# Patient Record
(demographics unavailable — no encounter records)

---

## 2025-01-28 NOTE — ASSESSMENT
[FreeTextEntry1] : 65F w/hypothyroidism, seasonal allergies, external hemorrhoids here to establish care and cold.  HCM - labs today, needs mammo, utd on dexa, pap, cscope. needs flu, will get after viral illness resolves Viral URI - check RVP, supportive care Hypothyroidism - c/w levothyroxine, check TSH Eczema - refer to derm

## 2025-01-28 NOTE — REVIEW OF SYSTEMS
[Chills] : chills [Nasal Discharge] : nasal discharge [Postnasal Drip] : postnasal drip [Negative] : Heme/Lymph [Fever] : no fever

## 2025-01-28 NOTE — HISTORY OF PRESENT ILLNESS
[FreeTextEntry1] : CPE & eval  [de-identified] : 65F w/hypothyroidism, seasonal allergies, external hemorrhoids here to establish care and cold Viral uri symptoms - started over a week ago, started with chills at the beginning, body aches, now with congestion and rhinorrhea. Using nasal spray which helps.  Hypothyroid - stable, levothyroxine 112mcg Eczema - using ointments per derm, hasn't seen while

## 2025-01-28 NOTE — HEALTH RISK ASSESSMENT
[0] : 2) Feeling down, depressed, or hopeless: Not at all (0) [PHQ-2 Negative - No further assessment needed] : PHQ-2 Negative - No further assessment needed [Time Spent: ___ Minutes] : I spent [unfilled] minutes performing a depression screening for this patient. [Yes] : Yes [Monthly or less (1 pt)] : Monthly or less (1 point) [1 or 2 (0 pts)] : 1 or 2 (0 points) [Never (0 pts)] : Never (0 points) [No] : In the past 12 months have you used drugs other than those required for medical reasons? No [Patient reported mammogram was normal] : Patient reported mammogram was normal [Patient reported PAP Smear was normal] : Patient reported PAP Smear was normal [Never] : Never [Patient reported bone density results were normal] : Patient reported bone density results were normal [HIV Test offered] : HIV Test offered [Hepatitis C test offered] : Hepatitis C test offered [With Significant Other] : lives with significant other [Retired] : retired [] :  [Smoke Detector] : smoke detector [Seat Belt] :  uses seat belt [Sunscreen] : uses sunscreen [Patient reported colonoscopy was normal] : Patient reported colonoscopy was normal [de-identified] : GYN [de-identified] : inactive, walks limited to apt  [de-identified] : yogurt, fruits, olive oil  [[id number]] : 0 [Sexually Active] : not sexually active [Reports changes in hearing] : Reports no changes in hearing [Reports changes in vision] : Reports no changes in vision [Reports changes in dental health] : Reports no changes in dental health [MammogramDate] : 2023 [PapSmearDate] : 2024 [BoneDensityDate] : 2023 [ColonoscopyDate] : 2021 [BoneDensityComments] : osteopenia  [ColonoscopyComments] : repeat due  [de-identified] : retired 2021  [de-identified] : needs dentist

## 2025-01-28 NOTE — PHYSICAL EXAM
[No Acute Distress] : no acute distress [Well-Appearing] : well-appearing [EOMI] : extraocular movements intact [Normal TMs] : both tympanic membranes were normal [Clear to Auscultation] : lungs were clear to auscultation bilaterally [Normal Rate] : normal rate  [Regular Rhythm] : with a regular rhythm [Soft] : abdomen soft [Non Tender] : non-tender [Normal Bowel Sounds] : normal bowel sounds [No Focal Deficits] : no focal deficits [Normal Affect] : the affect was normal [Normal Insight/Judgement] : insight and judgment were intact [de-identified] : pharyngeal cobblestone

## 2025-03-11 NOTE — HISTORY OF PRESENT ILLNESS
[FreeTextEntry1] : 66 yo F, hypothyroidism partial thyroidectomy, FHx of CRC, for initial consult for colonoscopy  reports had two prior colons, first she was unsedated and was painful, second was Dr. De Dios either 2 or 3 years ago, Recalls 7 polyps. Not sure where the record is  Feels well just had PCP visit and had blood work performed  MedHx: as above SurgHx: Had pancreas tissue in intestine that was bleeding??? , and Uterine Art embo on file,  Medications: levothyroxine All: nkda SocHx: denies tobacco , lives in [place] retired  FHx: mother with CRC at 77 EGD: one prior, doesn't recall results Colon: 2023 - 7 polyps Dr. De Dios (no report)  No blood in stool No dysphagia no gerd

## 2025-03-11 NOTE — END OF VISIT
[] : Fellow [Time Spent: ___ minutes] : I have spent [unfilled] minutes of time on the encounter which excludes teaching and separately reported services. [FreeTextEntry3] : 65F with a h/o hypothyroidism s/p partial thyroidectomy, FHx of CRC, for initial consult for colonoscopy. Schedule surveillance colonoscopy with golytely at [facility] vs [facility] per patient preference.   Everett Wilkerson MD Gastroenterology

## 2025-03-11 NOTE — ASSESSMENT
[FreeTextEntry1] : 66 yo F, hypothyroidism partial thyroidectomy, FHx of CRC, for initial consult for colonoscop  High risk due to personal history of polyps and Fhx in mother in late 70's  recent blood work reviewed without anemia  Plan Golytely prep (considers herself constipated) at either [facility] or [facility] Discussed Risks / Benefits / Alternatives to Colon Cancer Screening, including endoscopic risks of bleeding, infection, perforation, or missed lesion Discussed Diet and Bowel Prep leading up to procedure Discussed need for chaperone post procedurally  rtc post proc  seen and dw Dr. Rock Dennison MD GI Fellow [facility] Gastroenterology

## 2025-03-11 NOTE — PHYSICAL EXAM
[Normal] : the appearance was normal, no neck mass [No Respiratory Distress] : no respiratory distress [No Acc Muscle Use] : no accessory muscle use [Respiration, Rhythm And Depth] : normal respiratory rhythm and effort [None] : no edema [Normal Color / Pigmentation] : normal skin color and pigmentation [] : no rash [Normal Affect] : the affect was normal [Recent Memory Normal] : recent memory was not impaired [Normal Mood] : the mood was normal [Abdomen Tenderness] : non-tender [Abdomen Soft] : soft

## 2025-03-17 NOTE — PHYSICAL EXAM
[No Acute Distress] : no acute distress [Well-Appearing] : well-appearing [Normal Sclera/Conjunctiva] : normal sclera/conjunctiva [EOMI] : extraocular movements intact [Normal Outer Ear/Nose] : the outer ears and nose were normal in appearance [No Lymphadenopathy] : no lymphadenopathy [Thyroid Normal, No Nodules] : the thyroid was normal and there were no nodules present [No Respiratory Distress] : no respiratory distress  [Clear to Auscultation] : lungs were clear to auscultation bilaterally [Normal Rate] : normal rate  [Regular Rhythm] : with a regular rhythm [No Focal Deficits] : no focal deficits [Normal Affect] : the affect was normal [Normal Insight/Judgement] : insight and judgment were intact

## 2025-03-17 NOTE — HISTORY OF PRESENT ILLNESS
[FreeTextEntry8] : 65F w/hypothyroidism, seasonal allergies, external hemorrhoids, chronic hematuria here for acute visit.  Reports feeling heart palpitations since w/near syncope last week, went to  and had vitals and EKG were normal. Recommended to see PCP to check TFTs, reports being on stable dose of 112mcg for about 4 years.  No CP, sob, sweating, heat sensitivity or weakness. Also reports L jaw "locking" sensation w/pain in ear last week, but currently better since yesterday.  Does report symptoms did get better day by day, today, has not felt it.  Also reports ongoing feeling of something being stuck in throat.  Denies any travel recently, no sick contacts, no stressors.   Planned to see urology for hematuria, has cysto and renal US planned

## 2025-03-17 NOTE — ASSESSMENT
[FreeTextEntry1] : 65F w/hypothyroidism, seasonal allergies, external hemorrhoids, chronic hematuria here for acute visit. Palpitations - currently resolved, EKG reportedly normal at  per pt, will not repeat today. Check CBC, CMP, TSH  Globus sensation - refer to ENT Hypothyroidism - check TFTs, c/w Synthroid 112mcg

## 2025-03-21 NOTE — HISTORY OF PRESENT ILLNESS
[de-identified] : 65y F is here today for ongoing feeling of something being stuck in throat and ear pain. Patient has a constant throat clearing and coughing. Pt had recent lockjaw with radiating pain to the throat and that improved. Pt feels choking sensation intermittently. Pt always feels she has mucous.  Pt drinks water to help. Pt has seasonal allergies. Pt has seen medicine and GI- notes reviewed.

## 2025-03-21 NOTE — PHYSICAL EXAM
[de-identified] : edema  [Normal] : mucosa is normal [Midline] : trachea located in midline position

## 2025-05-02 NOTE — ASSESSMENT
[FreeTextEntry1] : 5/2/25: Taking pantoprazole/famotidine, xyzal, nasal steroid spray. Runny nose and ear pain has resolved. Less allergy symptoms. Mucus in throat increased after nasal spray. At this point, I am recommending she continue levocetirizine for allergy treatment. Discontinue nasal spray as this is irritating. Persistent postcricoid edema c/w LPR on laryngoscopy. Will discontinue pantoprazole and continue famotidine for LPR for additional 6-12 weeks. Follow up as needed  -Famotidine qPM (pt has refills) -Xyzal  RTC 3 months if sx persisting otherwise as needed

## 2025-05-02 NOTE — PHYSICAL EXAM
[Normal] : mucosa is normal [Midline] : trachea located in midline position [de-identified] : edema

## 2025-05-02 NOTE — REASON FOR VISIT
[Subsequent Evaluation] : a subsequent evaluation for [FreeTextEntry2] : Globus sensation and Ear pain

## 2025-05-02 NOTE — PROCEDURE
[True Vocal Cords Erythematous] : bilateral true vocal cord edema [Glottis Arytenoid Cartilages Erythema] : bilateral arytenoid ~M erythema [Globus] : globus [Flexible Endoscope] : examined with the flexible endoscope [Normal] : normal [Arytenoid Erythema ___ /4] : arytenoid erythema [unfilled]U/4 [Interarytenoid Edema] : interarytenoid area edematous

## 2025-05-02 NOTE — HISTORY OF PRESENT ILLNESS
[de-identified] : - 3/21/25: 65y F is here today for ongoing feeling of something being stuck in throat and ear pain. Patient has a constant throat clearing and coughing. Describes sensation of dust in her throat that would trigger her to cough. Pt had recent lockjaw with radiating pain to the throat and that improved. Pt feels choking sensation intermittently. Pt always feels she has mucous.  Pt drinks water to help. Pt has seasonal allergies. Pt has seen medicine and GI- notes reviewed.  - [FreeTextEntry1] : 5/2/25: Taking pantoprazole/famotidine, xyzal, nasal steroid spray. Runny nose and ear pain has resolved. Less allergy symptoms. Mucus in throat worse after nasal spray. Doing better overall all.

## 2025-07-02 NOTE — HISTORY OF PRESENT ILLNESS
[FreeTextEntry8] : 65F w/osteopenia, fatigue, hypothyroid, HLD here for acute visit for UTI symptoms.  [place], didn't use bathroom very often. Reporting burning and freq since last year. slowly improving.   Itching - back and around breasts, started last week, after heat wave, mild rash under, does wear underwire. Itching on back started after pt started using Vaseline

## 2025-07-02 NOTE — ASSESSMENT
[FreeTextEntry1] : 65F w/osteopenia, fatigue, hypothyroid, HLD here for acute visit for UTI symptoms. UTI - POCT UA +blood, nitrites and LE, rx Macrobid 100mg BIDx 5 days, send out UCx Intertrigo - clotrimazole 1% sent to pharm, avoid tight bras w/underwire, use cotton breathable fabrics.

## 2025-07-02 NOTE — REVIEW OF SYSTEMS
[Dysuria] : dysuria [Frequency] : frequency [Itching] : Itching [Skin Rash] : skin rash [Negative] : Heme/Lymph

## 2025-07-29 NOTE — HISTORY OF PRESENT ILLNESS
[FreeTextEntry8] : 65F w/osteopenia, fatigue, hypothyroid, HLD here for acute visit for palpitations.  Recently seen for UTI Reports palpitations, started about a week ago, only in the AM about 7:30-8:00AM, lasts 3 minutes, starts 1 hour after taking Synthroid.  Neck pain too this morning Also reports drinking coffee for breakfast, and palpitations start after suspects it is related.  Denies chest pain, shortness of breath.  Happened a while ago - saw cards years ago, did stress test.

## 2025-07-29 NOTE — ASSESSMENT
[FreeTextEntry1] : 65F w/osteopenia, fatigue, hypothyroid, HLD here for acute visit for palpitations. Palpitations - no assoc symptoms, poss trigger coffee in AM check TSH, electrolytes.  EKG reviewed - sinus, no STT changes - low voltage. will need to repeat at next visit.  Will abstain from coffee for the next few days, if still persistent will schedule with cardiology, referral provided.

## 2025-07-29 NOTE — REVIEW OF SYSTEMS
[Chest Pain] : no chest pain [Palpitations] : palpitations [Lower Ext Edema] : no lower extremity edema [Orthopnea] : no orthopnea [Paroxysmal Nocturnal Dyspnea] : no paroxysmal nocturnal dyspnea [Shortness Of Breath] : no shortness of breath [Wheezing] : no wheezing [Cough] : no cough [Dyspnea on Exertion] : no dyspnea on exertion [Negative] : Heme/Lymph

## 2025-07-29 NOTE — PHYSICAL EXAM
[No Acute Distress] : no acute distress [Well-Appearing] : well-appearing [Normal Sclera/Conjunctiva] : normal sclera/conjunctiva [No JVD] : no jugular venous distention [Supple] : supple [No Respiratory Distress] : no respiratory distress  [Clear to Auscultation] : lungs were clear to auscultation bilaterally [Normal Rate] : normal rate  [Regular Rhythm] : with a regular rhythm [No Murmur] : no murmur heard [No Edema] : there was no peripheral edema [Grossly Normal Strength/Tone] : grossly normal strength/tone [No Focal Deficits] : no focal deficits [Normal Affect] : the affect was normal [Normal Insight/Judgement] : insight and judgment were intact